# Patient Record
Sex: FEMALE | Race: WHITE | URBAN - METROPOLITAN AREA
[De-identification: names, ages, dates, MRNs, and addresses within clinical notes are randomized per-mention and may not be internally consistent; named-entity substitution may affect disease eponyms.]

---

## 2021-03-03 ENCOUNTER — EMERGENCY (EMERGENCY)
Facility: HOSPITAL | Age: 66
LOS: 1 days | End: 2021-03-03
Admitting: EMERGENCY MEDICINE
Payer: MEDICARE

## 2021-03-03 PROCEDURE — 12002 RPR S/N/AX/GEN/TRNK2.6-7.5CM: CPT

## 2021-03-03 PROCEDURE — 99283 EMERGENCY DEPT VISIT LOW MDM: CPT | Mod: 25

## 2022-09-09 ENCOUNTER — APPOINTMENT (OUTPATIENT)
Dept: ORTHOPEDIC SURGERY | Facility: CLINIC | Age: 67
End: 2022-09-09

## 2022-09-09 VITALS
HEART RATE: 60 BPM | WEIGHT: 180 LBS | SYSTOLIC BLOOD PRESSURE: 129 MMHG | DIASTOLIC BLOOD PRESSURE: 79 MMHG | HEIGHT: 62 IN | BODY MASS INDEX: 33.13 KG/M2

## 2022-09-09 DIAGNOSIS — Z72.89 OTHER PROBLEMS RELATED TO LIFESTYLE: ICD-10-CM

## 2022-09-09 DIAGNOSIS — Z85.850 PERSONAL HISTORY OF MALIGNANT NEOPLASM OF THYROID: ICD-10-CM

## 2022-09-09 DIAGNOSIS — Z82.61 FAMILY HISTORY OF ARTHRITIS: ICD-10-CM

## 2022-09-09 DIAGNOSIS — Z86.39 PERSONAL HISTORY OF OTHER ENDOCRINE, NUTRITIONAL AND METABOLIC DISEASE: ICD-10-CM

## 2022-09-09 DIAGNOSIS — Z87.891 PERSONAL HISTORY OF NICOTINE DEPENDENCE: ICD-10-CM

## 2022-09-09 DIAGNOSIS — M25.561 PAIN IN RIGHT KNEE: ICD-10-CM

## 2022-09-09 DIAGNOSIS — Z86.79 PERSONAL HISTORY OF OTHER DISEASES OF THE CIRCULATORY SYSTEM: ICD-10-CM

## 2022-09-09 DIAGNOSIS — M25.562 PAIN IN RIGHT KNEE: ICD-10-CM

## 2022-09-09 DIAGNOSIS — Z80.6 FAMILY HISTORY OF LEUKEMIA: ICD-10-CM

## 2022-09-09 PROCEDURE — 99204 OFFICE O/P NEW MOD 45 MIN: CPT

## 2022-09-09 PROCEDURE — 73564 X-RAY EXAM KNEE 4 OR MORE: CPT | Mod: 50

## 2022-09-09 RX ORDER — METOPROLOL SUCCINATE 25 MG/1
25 TABLET, EXTENDED RELEASE ORAL
Refills: 0 | Status: ACTIVE | COMMUNITY

## 2022-09-09 RX ORDER — TRIAMTERENE AND HYDROCHLOROTHIAZIDE 25; 37.5 MG/1; MG/1
37.5-25 TABLET ORAL
Refills: 0 | Status: ACTIVE | COMMUNITY

## 2022-09-09 RX ORDER — ATORVASTATIN CALCIUM 10 MG/1
10 TABLET, FILM COATED ORAL
Refills: 0 | Status: ACTIVE | COMMUNITY

## 2022-09-09 RX ORDER — BUPROPION HYDROCHLORIDE 300 MG/1
300 TABLET, EXTENDED RELEASE ORAL
Refills: 0 | Status: ACTIVE | COMMUNITY

## 2022-09-09 RX ORDER — LEVOTHYROXINE SODIUM 0.11 MG/1
112 TABLET ORAL
Refills: 0 | Status: ACTIVE | COMMUNITY

## 2022-09-09 NOTE — PHYSICAL EXAM
[de-identified] : The patient appears well nourished  and in no apparent distress.  The patient is alert and oriented to person, place, and time.   Affect and mood appear normal. The head is normocephalic and atraumatic.  The eyes reveal normal sclera and extra ocular muscles are intact. The tongue is midline with no apparent lesions.  Skin shows normal turgor with no evidence of eczema or psoriasis.  No respiratory distress noted.  Sensation grossly intact.		  [de-identified] : Exam of the right knee shows 0 to 128 degrees of flexion measured with a goniometer. There is a small effusion. Mild pain with flexion. There is patellofemoral crepitance with extension (more than on the left). \par Exam of the left knee shows 0 to 130 degrees of flexion measured with a goniometer. There is a small effusion. There is no pain with flexion. There is patellofemoral crepitance with extension (less than on the right). \par 5/5 motor strength bilaterally distally. Sensation intact distally.  [de-identified] : X-ray: 4 views of the right knee demonstrate moderate patellofemoral compartment joint space narrowing.		 \par X-ray: 4 views of the left knee demonstrate moderate patellofemoral compartment joint space narrowing.

## 2022-09-09 NOTE — DISCUSSION/SUMMARY
[de-identified] : MARIO VU is a 67 year female who presents with bilateral knee moderate patellofemoral compartment arthritis. Nonoperative treatment options were discussed including antiinflammatories, physical therapy, intraarticular cortisone injections, and hyaluronic acid gel injections. A prescription for physical therapy was provided. A course of Mobic was recommended. The patient was given a prescription for the Mobic with directions. She was instructed to stop the medicine and call the office if there are any adverse reaction to the medicine. She was also instructed to consult with their primary care doctor prior to starting the medication. The patient will follow up in 6-8 weeks. We will consider visco supplementation if her pain has not subsided by that time.

## 2022-09-09 NOTE — HISTORY OF PRESENT ILLNESS
[de-identified] : Ms. MARIO VU is a 67 year old female presenting for evaluation of bilateral knee pain right worse than left.  Knee pain is localized anteriorly and is worse with deep flexion as well as with stairs.  She denies any buckling or locking.  She denies any falls or trauma.  Patient has not had injections thus far.  She has tried therapy in the past without relief.  She takes anti-inflammatories without significant benefit

## 2022-09-09 NOTE — ADDENDUM
[FreeTextEntry1] : This note was authored by Joey Arriola working as a medical scribe for Dr. Edilberto Lemus. The note was reviewed, edited, and revised by Dr. Edilberto Lemus whom is in agreement with the exam findings, imaging findings, and treatment plan. 09/09/2022

## 2022-09-09 NOTE — CONSULT LETTER
[Dear  ___] : Dear  [unfilled], [Consult Letter:] : I had the pleasure of evaluating your patient, [unfilled]. [Please see my note below.] : Please see my note below. [Consult Closing:] : Thank you very much for allowing me to participate in the care of this patient.  If you have any questions, please do not hesitate to contact me. [Sincerely,] : Sincerely, [FreeTextEntry2] : JAYANT HOU MD\par  [FreeTextEntry3] : Edilberto Lemus MD\par Chief of Joint Replacement\par [facility] Knee Replacement \par [facility]\par \par

## 2022-10-20 RX ORDER — MELOXICAM 7.5 MG/1
7.5 TABLET ORAL
Qty: 60 | Refills: 0 | Status: ACTIVE | COMMUNITY
Start: 2022-09-09 | End: 1900-01-01

## 2023-01-13 ENCOUNTER — APPOINTMENT (OUTPATIENT)
Dept: ORTHOPEDIC SURGERY | Facility: CLINIC | Age: 68
End: 2023-01-13
Payer: MEDICARE

## 2023-01-13 VITALS
WEIGHT: 180 LBS | BODY MASS INDEX: 33.13 KG/M2 | SYSTOLIC BLOOD PRESSURE: 141 MMHG | HEART RATE: 68 BPM | HEIGHT: 62 IN | DIASTOLIC BLOOD PRESSURE: 86 MMHG

## 2023-01-13 DIAGNOSIS — M17.0 BILATERAL PRIMARY OSTEOARTHRITIS OF KNEE: ICD-10-CM

## 2023-01-13 PROCEDURE — 99213 OFFICE O/P EST LOW 20 MIN: CPT

## 2023-01-15 NOTE — PHYSICAL EXAM
[de-identified] : The patient appears well nourished  and in no apparent distress.  The patient is alert and oriented to person, place, and time.   Affect and mood appear normal. The head is normocephalic and atraumatic.  The eyes reveal normal sclera and extra ocular muscles are intact. The tongue is midline with no apparent lesions.  Skin shows normal turgor with no evidence of eczema or psoriasis.  No respiratory distress noted.  Sensation grossly intact.		  [de-identified] : Exam of the right knee shows 0 to 128 degrees of flexion measured with a goniometer. There is a small effusion. Mild pain with flexion. There is patellofemoral crepitance with extension (more than on the left). \par Exam of the left knee shows 0 to 130 degrees of flexion measured with a goniometer. There is a small effusion. There is no pain with flexion. There is patellofemoral crepitance with extension (less than on the right). \par 5/5 motor strength bilaterally distally. Sensation intact distally.  [de-identified] : prior X-ray: 4 views of the right knee demonstrate moderate patellofemoral compartment joint space narrowing.		 \par Prior X-ray: 4 views of the left knee demonstrate moderate patellofemoral compartment joint space narrowing.

## 2023-01-15 NOTE — HISTORY OF PRESENT ILLNESS
[de-identified] : Ms. MARIO VU is a 67 year old female presenting for evaluation of bilateral knee pain right worse than left.  Knee pain is localized anteriorly and is worse with deep flexion as well as with stairs.  She denies any buckling or locking.  She denies any falls or trauma.  Patient has not had injections thus far.  She has tried therapy in the past without relief.  She takes anti-inflammatories without significant benefit. She presents today to further discuss options.

## 2023-01-15 NOTE — DISCUSSION/SUMMARY
[de-identified] : MARIO VU is a 67 year female who presents with bilateral knee moderate patellofemoral compartment arthritis. Nonoperative treatment options were discussed including antiinflammatories, physical therapy, intraarticular cortisone injections, and hyaluronic acid gel injections. She declines injections today. She also denies need for PT script. She has meloxicam and denies need for refill. She will consider gel injections and call back if she wishes to schedule.

## 2023-01-15 NOTE — CONSULT LETTER
[Dear  ___] : Dear  [unfilled], [Consult Letter:] : I had the pleasure of evaluating your patient, [unfilled]. [Please see my note below.] : Please see my note below. [Consult Closing:] : Thank you very much for allowing me to participate in the care of this patient.  If you have any questions, please do not hesitate to contact me. [Sincerely,] : Sincerely, [FreeTextEntry3] : Edilberto Lemus MD\par Chief of Joint Replacement\par [facility] Knee Replacement \par [facility] [place]\par \par  [FreeTextEntry2] : JAYANT HOU MD\par

## 2024-04-01 ENCOUNTER — EMERGENCY (EMERGENCY)
Facility: HOSPITAL | Age: 69
LOS: 0 days | Discharge: HOME | End: 2024-04-01
Attending: EMERGENCY MEDICINE
Payer: MEDICARE

## 2024-04-01 VITALS — HEIGHT: 62 IN | WEIGHT: 164.91 LBS

## 2024-04-01 VITALS
TEMPERATURE: 98 F | SYSTOLIC BLOOD PRESSURE: 152 MMHG | DIASTOLIC BLOOD PRESSURE: 90 MMHG | HEART RATE: 50 BPM | RESPIRATION RATE: 18 BRPM | OXYGEN SATURATION: 100 %

## 2024-04-01 DIAGNOSIS — H55.00 UNSPECIFIED NYSTAGMUS: ICD-10-CM

## 2024-04-01 DIAGNOSIS — R00.1 BRADYCARDIA, UNSPECIFIED: ICD-10-CM

## 2024-04-01 DIAGNOSIS — R42 DIZZINESS AND GIDDINESS: ICD-10-CM

## 2024-04-01 LAB
ALBUMIN SERPL ELPH-MCNC: 3.9 G/DL — SIGNIFICANT CHANGE UP (ref 3.3–5)
ALP SERPL-CCNC: 91 U/L — SIGNIFICANT CHANGE UP (ref 40–120)
ALT FLD-CCNC: 30 U/L — SIGNIFICANT CHANGE UP (ref 12–78)
ANION GAP SERPL CALC-SCNC: 8 MMOL/L — SIGNIFICANT CHANGE UP (ref 5–17)
APTT BLD: 31.8 SEC — SIGNIFICANT CHANGE UP (ref 24.5–35.6)
AST SERPL-CCNC: 17 U/L — SIGNIFICANT CHANGE UP (ref 15–37)
BASOPHILS # BLD AUTO: 0 K/UL — SIGNIFICANT CHANGE UP (ref 0–0.2)
BASOPHILS NFR BLD AUTO: 0 % — SIGNIFICANT CHANGE UP (ref 0–2)
BILIRUB SERPL-MCNC: 0.4 MG/DL — SIGNIFICANT CHANGE UP (ref 0.2–1.2)
BUN SERPL-MCNC: 13 MG/DL — SIGNIFICANT CHANGE UP (ref 7–23)
CALCIUM SERPL-MCNC: 9.4 MG/DL — SIGNIFICANT CHANGE UP (ref 8.5–10.1)
CHLORIDE SERPL-SCNC: 106 MMOL/L — SIGNIFICANT CHANGE UP (ref 96–108)
CO2 SERPL-SCNC: 27 MMOL/L — SIGNIFICANT CHANGE UP (ref 22–31)
CREAT SERPL-MCNC: 0.83 MG/DL — SIGNIFICANT CHANGE UP (ref 0.5–1.3)
EGFR: 76 ML/MIN/1.73M2 — SIGNIFICANT CHANGE UP
EOSINOPHIL # BLD AUTO: 0.19 K/UL — SIGNIFICANT CHANGE UP (ref 0–0.5)
EOSINOPHIL NFR BLD AUTO: 2 % — SIGNIFICANT CHANGE UP (ref 0–6)
GLUCOSE BLDC GLUCOMTR-MCNC: 98 MG/DL — SIGNIFICANT CHANGE UP (ref 70–99)
GLUCOSE SERPL-MCNC: 114 MG/DL — HIGH (ref 70–99)
HCT VFR BLD CALC: 44.1 % — SIGNIFICANT CHANGE UP (ref 34.5–45)
HGB BLD-MCNC: 14.8 G/DL — SIGNIFICANT CHANGE UP (ref 11.5–15.5)
INR BLD: 0.86 RATIO — SIGNIFICANT CHANGE UP (ref 0.85–1.18)
LYMPHOCYTES # BLD AUTO: 2.45 K/UL — SIGNIFICANT CHANGE UP (ref 1–3.3)
LYMPHOCYTES # BLD AUTO: 26 % — SIGNIFICANT CHANGE UP (ref 13–44)
MANUAL SMEAR VERIFICATION: SIGNIFICANT CHANGE UP
MCHC RBC-ENTMCNC: 31.3 PG — SIGNIFICANT CHANGE UP (ref 27–34)
MCHC RBC-ENTMCNC: 33.6 GM/DL — SIGNIFICANT CHANGE UP (ref 32–36)
MCV RBC AUTO: 93.2 FL — SIGNIFICANT CHANGE UP (ref 80–100)
MONOCYTES # BLD AUTO: 0.47 K/UL — SIGNIFICANT CHANGE UP (ref 0–0.9)
MONOCYTES NFR BLD AUTO: 5 % — SIGNIFICANT CHANGE UP (ref 2–14)
MYELOCYTES NFR BLD: 1 % — HIGH (ref 0–0)
NEUTROPHILS # BLD AUTO: 6.13 K/UL — SIGNIFICANT CHANGE UP (ref 1.8–7.4)
NEUTROPHILS NFR BLD AUTO: 64 % — SIGNIFICANT CHANGE UP (ref 43–77)
NEUTS BAND # BLD: 1 % — SIGNIFICANT CHANGE UP (ref 0–8)
NRBC # BLD: 0 /100 WBCS — SIGNIFICANT CHANGE UP (ref 0–0)
NRBC # BLD: SIGNIFICANT CHANGE UP /100 WBCS (ref 0–0)
PLAT MORPH BLD: NORMAL — SIGNIFICANT CHANGE UP
PLATELET # BLD AUTO: 368 K/UL — SIGNIFICANT CHANGE UP (ref 150–400)
POTASSIUM SERPL-MCNC: 4.2 MMOL/L — SIGNIFICANT CHANGE UP (ref 3.5–5.3)
POTASSIUM SERPL-SCNC: 4.2 MMOL/L — SIGNIFICANT CHANGE UP (ref 3.5–5.3)
PROT SERPL-MCNC: 7.5 GM/DL — SIGNIFICANT CHANGE UP (ref 6–8.3)
PROTHROM AB SERPL-ACNC: 9.8 SEC — SIGNIFICANT CHANGE UP (ref 9.5–13)
RBC # BLD: 4.73 M/UL — SIGNIFICANT CHANGE UP (ref 3.8–5.2)
RBC # FLD: 11.8 % — SIGNIFICANT CHANGE UP (ref 10.3–14.5)
RBC BLD AUTO: NORMAL — SIGNIFICANT CHANGE UP
SODIUM SERPL-SCNC: 141 MMOL/L — SIGNIFICANT CHANGE UP (ref 135–145)
TROPONIN I, HIGH SENSITIVITY RESULT: 3.27 NG/L — SIGNIFICANT CHANGE UP
VARIANT LYMPHS # BLD: 1 % — SIGNIFICANT CHANGE UP (ref 0–6)
WBC # BLD: 9.43 K/UL — SIGNIFICANT CHANGE UP (ref 3.8–10.5)
WBC # FLD AUTO: 9.43 K/UL — SIGNIFICANT CHANGE UP (ref 3.8–10.5)

## 2024-04-01 PROCEDURE — 70450 CT HEAD/BRAIN W/O DYE: CPT | Mod: 26,MC

## 2024-04-01 PROCEDURE — 80053 COMPREHEN METABOLIC PANEL: CPT

## 2024-04-01 PROCEDURE — 82962 GLUCOSE BLOOD TEST: CPT

## 2024-04-01 PROCEDURE — 70450 CT HEAD/BRAIN W/O DYE: CPT | Mod: MC

## 2024-04-01 PROCEDURE — 99285 EMERGENCY DEPT VISIT HI MDM: CPT

## 2024-04-01 PROCEDURE — 93010 ELECTROCARDIOGRAM REPORT: CPT

## 2024-04-01 PROCEDURE — 85730 THROMBOPLASTIN TIME PARTIAL: CPT

## 2024-04-01 PROCEDURE — 85025 COMPLETE CBC W/AUTO DIFF WBC: CPT

## 2024-04-01 PROCEDURE — 36415 COLL VENOUS BLD VENIPUNCTURE: CPT

## 2024-04-01 PROCEDURE — 84484 ASSAY OF TROPONIN QUANT: CPT

## 2024-04-01 PROCEDURE — 99285 EMERGENCY DEPT VISIT HI MDM: CPT | Mod: 25

## 2024-04-01 PROCEDURE — 85610 PROTHROMBIN TIME: CPT

## 2024-04-01 PROCEDURE — 93005 ELECTROCARDIOGRAM TRACING: CPT

## 2024-04-01 RX ORDER — MECLIZINE HCL 12.5 MG
1 TABLET ORAL
Qty: 9 | Refills: 0
Start: 2024-04-01 | End: 2024-04-03

## 2024-04-01 RX ORDER — MECLIZINE HCL 12.5 MG
1 TABLET ORAL
Qty: 30 | Refills: 0
Start: 2024-04-01

## 2024-04-01 NOTE — ED PROVIDER NOTE - PHYSICAL EXAMINATION
Constitutional: NAD   Eyes: +Mild nystagmus in left lateral gaze.  Head: Normocephalic   Mouth: MMM  Cardiac: regular rate   Resp: Lungs CTAB  GI: Abd s/nt/nd, no rebound or guarding.  Neuro: awake, alert, moving all extremities. NIH 0.  Skin: No rashes

## 2024-04-01 NOTE — ED PROVIDER NOTE - CARE PROVIDER_API CALL
Real Luna  Neurology  [address] [place] [address], [facility] [address]  [place] [address]  Phone: [phone]  Fax: [phone]  Follow Up Time: 1-3 Days

## 2024-04-01 NOTE — ED PROVIDER NOTE - NSFOLLOWUPINSTRUCTIONS_ED_ALL_ED_FT
Follow-up with your regular physician  Return with any persistent/worsening symptoms.   Meclizine as prescribed    Vertigo    Vertigo means that you feel like you are moving when you are not. Vertigo can also make you feel like things around you are moving when they are not. This feeling can come and go at any time. Vertigo often goes away on its own.     HOME CARE  Avoid making fast movements.  Avoid driving.  Avoid using heavy machinery.  Avoid doing any task or activity that might cause danger to you or other people if you would have a vertigo attack while you are doing it.  Sit down right away if you feel dizzy or have trouble with your balance.  Take over-the-counter and prescription medicines only as told by your doctor.  Follow instructions from your doctor about which positions or movements you should avoid.  Drink enough fluid to keep your pee (urine) clear or pale yellow.  Keep all follow-up visits as told by your doctor. This is important.    GET HELP IF:  Medicine does not help your vertigo.  You have a fever.  Your problems get worse or you have new symptoms.  Your family or friends see changes in your behavior.  You feel sick to your stomach (nauseous) or you throw up (vomit).  You have a "pins and needles" feeling or you are numb in part of your body.    GET HELP RIGHT AWAY IF:  You have trouble moving or talking.  You are always dizzy.  You pass out (faint).  You get very bad headaches.  You feel weak or have trouble using your hands, arms, or legs.  You have changes in your hearing.  You have changes in your seeing (vision).  You get a stiff neck.  Bright light starts to bother you.    ADDITIONAL NOTES AND INSTRUCTIONS    Please follow up with your Primary MD in 24-48 hr.  Seek immediate medical care for any new/worsening signs or symptoms.

## 2024-04-01 NOTE — ED ADULT NURSE NOTE - CHIEF COMPLAINT QUOTE
pt c/o dizziness, . pt states " I went to bed normal and woke up at 6 am to go to bathroom and was fine. I went back to bed woke up at 8 am and was extremely dizzy unable to stand up , I was off balance,nausea, felt like a fog in my ears". I feel chest discomfort now and if I turn my head It starts again.  bmg in triage 98,. pmh: [id number], htn, hld. md Ramirez at triage no code stroke.

## 2024-04-01 NOTE — ED PROVIDER NOTE - OBJECTIVE STATEMENT
69 y/o female with no pertinent PMHx presents to the ED c/o vertiginous dizziness onset this morning at 8AM. [place] well last night; woke up 6AM, went to bathroom and felt fine at that time. Then woke up at 8AM feeling dizzy and off balance, symptoms exacerbated with head movement. Denies history of vertigo.

## 2024-04-01 NOTE — ED ADULT TRIAGE NOTE - CHIEF COMPLAINT QUOTE
pt c/o dizziness, . pt states " I went to bed normal and woke up at 6 am to go to bathroom and was fine. I went back to bed woke up at 8 am and was extremely dizzy unable to stand up , I was off balance,nausea, felt like a fog in my ears". I feel chest discomfort now and if I turn my head It starts again.  bmg in triage 98,. pmh: [id number], htn, hld. md Ramirez at triage no code stroke.

## 2024-04-01 NOTE — ED PROVIDER NOTE - NS_EDPROVIDERDISPOUSERTYPE_ED_A_ED
Scribe Attestation (For Scribes USE Only)... Attending Attestation (For Attendings USE Only).../Scribe Attestation (For Scribes USE Only)... never

## 2024-04-01 NOTE — ED PROVIDER NOTE - PATIENT PORTAL LINK FT
You can access the FollowMyHealth Patient Portal offered by [facility] by registering at the following website: [email or web address]. By joining [employer]’s FollowMyHealth portal, you will also be able to view your health information using other applications (apps) compatible with our system.

## 2024-04-01 NOTE — ED ADULT NURSE NOTE - NSFALLRISKINTERV_ED_ALL_ED

## 2024-04-01 NOTE — ED ADULT NURSE NOTE - OBJECTIVE STATEMENT
pt c/o dizziness beginning at 6am this morning. pt endorses nausea but no vomiting. pt endorses chest discomfort and mild headache. pt states "if I turn my head, the dizziness starts again" denies ear pain, visual changes,. pt states she has a pmh of palpitations and thyroid ca. pt had thyroidectomy in 2003 and takes synthroid and metoprolol daily. 20G placed to left arm. labs drawn and sent.

## 2024-04-01 NOTE — ED PROVIDER NOTE - PROGRESS NOTE DETAILS
Asymptomatic, normal labs, CT.   Will d/c with Rx for meclizine Patient was signed out to me pending CT.  CT negative.  Patient is ambulating with steady gait she is asymptomatic now.  Patient states that when she woke up and got out of bed she felt dizzy like the room was spinning symptoms worse with head movement resolves while sitting still.  Currently patient does not have dizziness or vertigo.  Her neurological exam is normal. symptoms consistent with peripheral vertigo comfortable going home will DC with follow-up and strict return precautions. Pablo Gilbert M.D., Attending Physician